# Patient Record
Sex: FEMALE | Race: AMERICAN INDIAN OR ALASKA NATIVE
[De-identification: names, ages, dates, MRNs, and addresses within clinical notes are randomized per-mention and may not be internally consistent; named-entity substitution may affect disease eponyms.]

---

## 2019-04-04 ENCOUNTER — HOSPITAL ENCOUNTER (EMERGENCY)
Dept: HOSPITAL 42 - ED | Age: 39
Discharge: HOME | End: 2019-04-04
Payer: COMMERCIAL

## 2019-04-04 VITALS — BODY MASS INDEX: 26.6 KG/M2

## 2019-04-04 VITALS
DIASTOLIC BLOOD PRESSURE: 83 MMHG | TEMPERATURE: 98.4 F | SYSTOLIC BLOOD PRESSURE: 117 MMHG | RESPIRATION RATE: 18 BRPM | OXYGEN SATURATION: 98 % | HEART RATE: 71 BPM

## 2019-04-04 DIAGNOSIS — S93.401A: Primary | ICD-10-CM
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
## 2019-04-04 NOTE — RAD
Date of service: 



04/04/2019



PROCEDURE:  Right Foot Radiographs.



HISTORY:

 lateral foot pain s/p fall 



COMPARISON:

None.



TECHNIQUE:

3 views obtained.



FINDINGS:



BONES:

Normal. No fracture. 



JOINTS:

Normal. 



SOFT TISSUES:

Normal. 



OTHER FINDINGS:

None.



IMPRESSION:

Normal right foot radiographs.

## 2019-04-04 NOTE — ED PDOC
Arrival/HPI





- General


Chief Complaint: Lower Extremity Problem/Injury


Time Seen by Provider: 04/04/19 14:05


Historian: Patient





- History of Present Illness


Narrative History of Present Illness (Text): 





04/04/19 15:14


Elo Rodriguez is a 38 year old female with no significant past medical history, 

who presents to the emergency department complaining of right lateral ankle pain

s/p mechanical fall this morning. Patient states she was walking by her work 

place when she tripped and rolled on her right ankle. Patient denies head strike

or loss of consciousness. Patient is able to bear weight on her right ankle. Has

not taken any medication for pain. Patient denies any weakness, nausea, 

paresthesia, chest pain, back pain, neck pain, headache, dizziness, vision 

changes, or any other complaints.  





Time/Duration: 4-6 hours (Morning.)


Symptom Onset: Sudden


Activities at Onset: Light


Context: Street





Past Medical History





- Provider Review


Nursing Documentation Reviewed: Yes





- Infectious Disease


Hx of Infectious Diseases: None





- Tetanus Immunization


Tetanus Immunization: Unknown





- Pulmonary


Hx Respiratory Disorders: Yes


Hx Asthma: Yes (NEVER HOSPITALIZED)





- Hematological/Oncological


Hx Blood Disorders: Yes


Other/Comment: Lyme disease





- Musculoskeletal/Rheumatological


Hx Musculoskeletal Disorders: Yes


Hx Arthritis: Yes (LEFT KNEE)


Other/Comment: scoliosis





- Gastrointestinal


Hx Gastrointestinal Disorders: Yes


Hx Gall Bladder Disease: Yes


Hx Gastritis: Yes





- Genitourinary/Gynecological


Hx Genitourinary Disorders: Yes


Hx Reproductive Disorders: Yes (UTERINE LEIOMYOMA)


Hx Urinary Tract Infection: Yes


Other/Comment: endometriosis





- Psychiatric


Hx Substance Use: No





- Surgical History


Hx Cholecystectomy: Yes (2013 in [place] [facility])





- Anesthesia


Hx Anesthesia: Yes


Hx Anesthesia Reactions: Yes (NAUSEA)


Hx Malignant Hyperthermia: No





- Suicidal Assessment


Feels Threatened In Home Enviroment: No





Family/Social History





- Physician Review


Nursing Documentation Reviewed: Yes


Family/Social History: Unknown Family HX


Smoking Status: Never Smoked


Hx Alcohol Use: Yes


Hx Substance Use: No


Hx Substance Use Treatment: No





Allergies/Home Meds


Allergies/Adverse Reactions: 


Allergies





ciprofloxacin [From Cipro] Allergy (Severe, Verified 04/04/19 14:08)


   ANAPHYLAXIS


ciprofloxacin HCl [From Cipro] Allergy (Severe, Verified 04/04/19 14:08)


   ANAPHYLAXIS


doxycycline Allergy (Severe, Verified 04/04/19 14:08)


   ANAPHYLAXIS


nut - unspecified Allergy (Severe, Verified 04/04/19 14:08)


   ANAPHYLAXIS


adhesive tape Allergy (Intermediate, Verified 04/04/19 14:08)


   RASH


amoxicillin Allergy (Intermediate, Verified 04/04/19 14:08)


   URTICARIA


cefuroxime Allergy (Intermediate, Verified 04/04/19 14:08)


   URTICARIA


nitrofurantoin [From Macrobid] Allergy (Intermediate, Verified 04/04/19 14:08)


   RASH


nitrofurantoin macrocrystalline [From Macrobid] Allergy (Intermediate, Verified 

04/04/19 14:08)


   RASH


Penicillins Allergy (Intermediate, Verified 04/04/19 14:08)


   RASH


sulfamethoxazole [From Bactrim] Allergy (Intermediate, Verified 04/04/19 14:08)


   ITCHING


trimethoprim [From Bactrim] Allergy (Intermediate, Verified 04/04/19 14:08)


   ITCHING


spermicide Allergy (Uncoded 04/04/19 14:08)


   RASH








Home Medications: 


                                    Home Meds











 Medication  Instructions  Recorded  Confirmed


 


Albuterol Sulfate [Proventil Hfa] 2 puff IH Q6 PRN 04/27/17 04/04/19














Review of Systems





- Physician Review


All systems were reviewed & negative as marked: Yes





- Review of Systems


Constitutional: absent: Fatigue


Eyes: absent: Vision Changes


ENT: absent: Hearing Changes


Respiratory: absent: SOB, Cough


Cardiovascular: absent: Chest Pain, Edema


Gastrointestinal: absent: Abdominal Pain


Musculoskeletal: Other (Right ankle pain. ).  absent: Back Pain, Neck Pain


Skin: absent: Rash, Skin Lesions


Neurological: absent: Headache, Dizziness, Facial Droop


Endocrine: absent: Diaphoresis


Psychiatric: absent: Anxiety





Physical Exam


Vital Signs Reviewed: Yes





Vital Signs











  Temp Pulse Resp BP Pulse Ox


 


 04/04/19 14:12  98.4 F  71  18  117/83  98











Temperature: Afebrile


Blood Pressure: Normal


Pulse: Regular


Respiratory Rate: Normal


Appearance: Positive for: Well-Appearing, Non-Toxic, Comfortable


Pain Distress: None


Mental Status: Positive for: Alert and Oriented X 3





- Systems Exam


Head: Present: Atraumatic, Normocephalic


Pupils: Present: PERRL.  No: Sluggish, Non-Reactive


Extroacular Muscles: Present: EOMI


Conjunctiva: Present: Normal


Mouth: Present: Moist Mucous Membranes.  No: Drooling


Neck: Present: Normal Range of Motion.  No: Meningeal Signs, MIDLINE TENDERNESS


Respiratory/Chest: Present: Clear to Auscultation, Good Air Exchange.  No: 

Respiratory Distress, Accessory Muscle Use, Wheezes


Cardiovascular: Present: Regular Rate and Rhythm, Normal S1, S2.  No: Murmurs


Abdomen: No: Tenderness, Distention, Peritoneal Signs


Upper Extremity: Present: Normal Inspection, Normal ROM.  No: Cyanosis, Edema, 

Tenderness


Lower Extremity: Present: Tenderness (Tenderness to right lateral ankle and 

right lateral dorsal foot. ).  No: Edema


Neurological: Present: GCS=15, Speech Normal


Skin: Present: Warm, Dry, Normal Color.  No: Rashes


Psychiatric: Present: Alert, Oriented x 3, Normal Insight, Normal Concentration





Medical Decision Making


ED Course and Treatment: 





04/04/19 15:28


Impression:


38 year old female who presents to the emergency department complaining of right

ankle pain. 





Plan:


-- X-ray of right ankle. 


-- X--ray of right foot. 


-- Reassess and disposition





Prior Visits:


Notes and results from previous visits were reviewed.





Progress Notes:





Xrays negative for acute fracture or dislocation. 


Pt placed in right ankle ACE wrap and aircast by ED tech. Crutch training 

provided by ED tech. Pt able to demonstrate safe and appropriate crutch use 

prior to discharge. Advised PMD and podiatry followup.





Diagnostic testing results and plan of care discussed with patient. Strict 

instructions given regarding prescription use, importance of followup, and 

signs/symptoms to return to ER including numbness, weakness, paresthesias, or 

any other new/worsening symptoms. Pt verbalized understanding of discussion. 

Patient is A&Ox3, ambulating with steady gait with crutches, with vital signs 

stable for discharge.








- RAD Interpretation


Narrative RAD Interpretations (Text): 





04/04/19 15:22


X-Ray of right foot reviewed by radiologist, shows:





FINDINGS:


BONES:


Normal. No fracture. 


JOINTS:


Normal. 


SOFT TISSUES:


Normal. 


OTHER FINDINGS:


None.


IMPRESSION:


Normal right foot radiographs.





X-ray of right ankle reviewed by radiologist, shows:


FINDINGS:


BONES:


Normal. No fracture. 


JOINTS:


Normal. No osteoarthritis. Ankle mortise maintained. Talar dome intact


SOFT TISSUES:


Normal. 


OTHER FINDINGS:


None.


IMPRESSION:


Normal right ankle radiographs.





Radiology Orders: 











04/04/19 14:40


ANKLE RIGHT 3 VIEWS ROUTINE [RAD] Stat 


FOOT RIGHT 3 VIEWS ROUTINE [RAD] Stat 











: Radiologist





- PA / NP / Resident Statement


MD/DO has reviewed & agrees with the documentation as recorded.





- Scribe Statement


The provider has reviewed the documentation as recorded by the Gagan de la rosa training under [facility]. 





All medical record entries made by the Gagan were at my direction and 

personally dictated by me. I have reviewed the chart and agree that the record 

accurately reflects my personal performance of the history, physical exam, 

medical decision making, and the department course for this patient. I have also

 personally directed, reviewed, and agree with the discharge instructions and 

disposition.





Disposition/Present on Arrival





- Present on Arrival


Any Indicators Present on Arrival: No


History of DVT/PE: No


History of Uncontrolled Diabetes: No


Urinary Catheter: No


History of Decub. Ulcer: No


History Surgical Site Infection Following: None





- Disposition


Have Diagnosis and Disposition been Completed?: Yes


Diagnosis: 


 Ankle sprain





Disposition: HOME/ ROUTINE


Disposition Time: 15:45


Patient Plan: Discharge


Condition: GOOD


Discharge Instructions (ExitCare):  Ankle Sprain (DC)


Additional Instructions: 


Ibuprofen/tylenol for pain


Weight bear as tolerated with crutches


Rest, ice, elevate injured ankle


Followup with podiatry within 2 days


Followup with primary within 2 days


Return to ER with any new/worsening symptoms


Referrals: 


Podiatry Clinic [Outside] - Follow up with primary


Forms:  [employer] (English), WORK NOTE

## 2019-04-04 NOTE — RAD
Date of service: 



04/04/2019



PROCEDURE:  Right Ankle Radiographs.



HISTORY:

 lateral ankle pain s/p fall 



COMPARISON:

None available.



TECHNIQUE:

3 views obtained.



FINDINGS:



BONES:

Normal. No fracture. 



JOINTS:

Normal. No osteoarthritis. Ankle mortise maintained. Talar dome intact



SOFT TISSUES:

Normal. 



OTHER FINDINGS:

None.



IMPRESSION:

Normal right ankle radiographs.